# Patient Record
Sex: MALE | Race: WHITE | URBAN - METROPOLITAN AREA
[De-identification: names, ages, dates, MRNs, and addresses within clinical notes are randomized per-mention and may not be internally consistent; named-entity substitution may affect disease eponyms.]

---

## 2017-11-24 ENCOUNTER — EMERGENCY (EMERGENCY)
Facility: HOSPITAL | Age: 72
LOS: 1 days | Discharge: HOME | End: 2017-11-24
Attending: EMERGENCY MEDICINE | Admitting: EMERGENCY MEDICINE
Payer: MEDICARE

## 2017-11-24 VITALS — RESPIRATION RATE: 19 BRPM | OXYGEN SATURATION: 96 % | TEMPERATURE: 98 F | HEART RATE: 63 BPM | HEIGHT: 70 IN

## 2017-11-24 DIAGNOSIS — Z98.890 OTHER SPECIFIED POSTPROCEDURAL STATES: Chronic | ICD-10-CM

## 2017-11-24 LAB
APPEARANCE UR: CLEAR — SIGNIFICANT CHANGE UP
BILIRUB UR-MCNC: NEGATIVE — SIGNIFICANT CHANGE UP
COLOR SPEC: YELLOW — SIGNIFICANT CHANGE UP
DIFF PNL FLD: NEGATIVE — SIGNIFICANT CHANGE UP
GLUCOSE UR QL: NEGATIVE — SIGNIFICANT CHANGE UP
KETONES UR-MCNC: NEGATIVE — SIGNIFICANT CHANGE UP
LEUKOCYTE ESTERASE UR-ACNC: NEGATIVE — SIGNIFICANT CHANGE UP
NITRITE UR-MCNC: NEGATIVE — SIGNIFICANT CHANGE UP
PH UR: 5.5 — SIGNIFICANT CHANGE UP (ref 5–8)
PROT UR-MCNC: SIGNIFICANT CHANGE UP
RBC CASTS # UR COMP ASSIST: SIGNIFICANT CHANGE UP /HPF (ref 0–2)
SP GR SPEC: 1.03 — HIGH (ref 1.01–1.02)
UROBILINOGEN FLD QL: NEGATIVE — SIGNIFICANT CHANGE UP
WBC UR QL: SIGNIFICANT CHANGE UP /HPF (ref 0–5)

## 2017-11-24 PROCEDURE — 74176 CT ABD & PELVIS W/O CONTRAST: CPT | Mod: 26

## 2017-11-24 PROCEDURE — 99284 EMERGENCY DEPT VISIT MOD MDM: CPT

## 2017-11-24 RX ORDER — ACETAMINOPHEN 500 MG
650 TABLET ORAL ONCE
Qty: 0 | Refills: 0 | Status: COMPLETED | OUTPATIENT
Start: 2017-11-24 | End: 2017-11-24

## 2017-11-24 RX ORDER — VALSARTAN 80 MG/1
0 TABLET ORAL
Qty: 0 | Refills: 0 | COMMUNITY

## 2017-11-24 RX ORDER — ESOMEPRAZOLE MAGNESIUM 40 MG/1
0 CAPSULE, DELAYED RELEASE ORAL
Qty: 0 | Refills: 0 | COMMUNITY

## 2017-11-24 RX ORDER — LIDOCAINE 4 G/100G
1 CREAM TOPICAL ONCE
Qty: 0 | Refills: 0 | Status: COMPLETED | OUTPATIENT
Start: 2017-11-24 | End: 2017-11-24

## 2017-11-24 RX ORDER — GABAPENTIN 400 MG/1
1 CAPSULE ORAL
Qty: 0 | Refills: 0 | COMMUNITY

## 2017-11-24 RX ORDER — ROSUVASTATIN CALCIUM 5 MG/1
0 TABLET ORAL
Qty: 0 | Refills: 0 | COMMUNITY

## 2017-11-24 RX ORDER — QUETIAPINE FUMARATE 200 MG/1
0 TABLET, FILM COATED ORAL
Qty: 0 | Refills: 0 | COMMUNITY

## 2017-11-24 RX ORDER — OXYCODONE AND ACETAMINOPHEN 5; 325 MG/1; MG/1
1 TABLET ORAL ONCE
Qty: 0 | Refills: 0 | Status: DISCONTINUED | OUTPATIENT
Start: 2017-11-24 | End: 2017-11-24

## 2017-11-24 RX ADMIN — LIDOCAINE 1 PATCH: 4 CREAM TOPICAL at 23:28

## 2017-11-24 RX ADMIN — Medication 650 MILLIGRAM(S): at 23:23

## 2017-11-24 RX ADMIN — OXYCODONE AND ACETAMINOPHEN 1 TABLET(S): 5; 325 TABLET ORAL at 23:23

## 2017-11-24 NOTE — ED ADULT TRIAGE NOTE - CHIEF COMPLAINT QUOTE
chronic lower back pain x worse today, non traumatic, (denies bowel/bladder incontinence), +ambulatory

## 2017-11-24 NOTE — ED PROVIDER NOTE - CHIEF COMPLAINT
The patient is a 72y Male complaining of The patient is a 72y Male complaining of low back pain x 2 days.

## 2017-11-24 NOTE — ED PROVIDER NOTE - CARE PLAN
Principal Discharge DX:	Acute midline low back pain without sciatica  Secondary Diagnosis:	Disc degeneration, lumbar

## 2017-11-24 NOTE — ED PROVIDER NOTE - NS CPE EDP MUSC LUMBAR LOC
Limited ROM with flexion and extension at the waist. No bruising, discoloration, or obvious signs of trauma./tenderness

## 2017-11-24 NOTE — ED PROVIDER NOTE - ATTENDING CONTRIBUTION TO CARE
71 yo M presents with 2 days of low back pain across the entire lower back. 8/10 worse with movement. there is radiation to bilateral testicles and he reports 5/10 pain in both testicles. no urinary complaints. No radiation down the legs. No associated incontinence, weakness/numbness.   PE with diffuse L spine TTP. normal rectal tone, normal perirectal sensation. normal strength and sensation of lower extremities. no abdominal TTP. bilateral testicular TTP, but no associated swelling or redness. 73 yo M presents with 2 days of low back pain across the entire lower back. 8/10 worse with movement. there is radiation to bilateral testicles and he reports 5/10 pain in both testicles. no urinary complaints. No radiation down the legs. No associated incontinence, weakness/numbness.   PE with diffuse L spine TTP. normal rectal tone, normal perirectal sensation. normal strength and sensation of lower extremities. no abdominal TTP. bilateral testicular TTP, but no associated swelling or redness.  ED workup reveals  multilevel degenerative spine disease with multilevel disc bulge resultsing in mild-moderate spinal canal stenosis. there are also other incidental findings which I discussed with pt. patient was started on prednisone in the ER and treated with pain meds. on repeat eval he reported full resolution of his pain, and he was observed ambulating without any difficulty. he remains entirely neuro intact, and therefore I believe patient is safe to be discharged with spine followup.  I had ordered US testicles to evaluate for testicualr torsion/epidymitis/orchitis/etc, however patient stated that he felt back to normal and declined US testicles. pt is AAOx3 has full decisonal making capicity, and understands that without US we cannot r/o testicular torsion which would result in loss of testicle.   greyson was discharged at his request with instructions to rest, avoid heavy lifting/bending/physical activity, and f/u with PMD and spine Dr in 1-2 days for repeat eval and further management    The patient was discharged from the ED in stable condition. All results of today's workup were discussed with the patient and all questions/concerns were addressed. All discharge instructions were thoroughly discussed with the patient, as well as important warning signs and new/ worsening symptoms which should necessitate patient's immediate return to the ED. The patient is agreeable with discharge and expresses full understanding of all instructions given.

## 2017-11-24 NOTE — ED PROVIDER NOTE - CONSTITUTIONAL, MLM
normal... Well appearing, well nourished, awake, alert, oriented to person, place, time/situation. Appears slightly uncomfortable but in no acute distress.

## 2017-11-24 NOTE — ED ADULT NURSE NOTE - OBJECTIVE STATEMENT
Patient presented to ED ambulatory w/wife c/o lower back pain radiating to both legs since yesterday. Patient stated this pain is chronic but "never" this much pain. No further complaints.

## 2017-11-24 NOTE — ED PROVIDER NOTE - PMH
Back pain    GERD (gastroesophageal reflux disease)    High cholesterol    HTN (hypertension)    Insomnia    Prostate cancer

## 2017-11-24 NOTE — ED PROVIDER NOTE - GASTROINTESTINAL, MLM
No discoloration or pulsatile mass noted. Abdomen is soft, non-tender and non-distended. No guarding or rigidity noted.

## 2017-11-24 NOTE — ED PROVIDER NOTE - CHPI ED SYMPTOMS NEG
no bowel dysfunction/no constipation/no motor function loss/no neck tenderness/no numbness/no fatigue/no bladder dysfunction/no tingling

## 2017-11-24 NOTE — ED PROVIDER NOTE - GENITOURINARY TESTICULAR EXAM, LEFT
No swelling or discoloration noted. Tenderness noted to palpation of left testicle./TENDERNESS/cremasteric reflex present No swelling or discoloration noted. No abnormal lie. Tenderness noted to palpation of left testicle with palpable epididymis. Testicle rises appropriately when eliciting cremasteric reflex/TENDERNESS/cremasteric reflex present

## 2017-11-24 NOTE — ED PROVIDER NOTE - OBJECTIVE STATEMENT
Patient is a 71 yo male with a PMHx of HTN, Prostate Cancer (radiation and chemo treatment 1 year prior), Patient is a 71 yo male with a PMHx of HTN, Prostate Cancer (radiation and chemo treatment 1 year prior), and GERD presenting to the ED complaining of low back pain with radiation to the bilateral testicles since yesterday morning. Patient does not recall what he was doing when the low back pain began yesterday. He had taken Voltaren for the pain yesterday with no relief. This morning he was stepping into the shower when he felt the pain acutely worsen. The pain is constant, located midline in his lower back with some radiation to both testicles. The back pain is worsened with any movement and he has a tough time finding a comfortable position. He can ambulate on his own but states he feels pain in his lower back with each step. He denies radiation or numbness/tingling down either leg. He denies any fever/chills, saddle anesthesia, bowel/bladder incontinence, dysuria, hematuria, frequency, N/V Patient is a 73 yo male with a PMHx of HTN, Prostate Cancer (radiation and chemo treatment 1 year prior), and GERD presenting to the ED complaining of low back pain with radiation to the bilateral testicles since yesterday morning. Patient does not recall what he was doing when the low back pain began yesterday. He had taken Voltaren for the pain yesterday with no relief. This morning he was stepping into the shower when he felt the pain acutely worsen. The pain is constant, located midline in his lower back with some radiation to both testicles. The back pain is worsened with any movement and he has a tough time finding a comfortable position. He can ambulate on his own but states he feels pain in his lower back with each step. He denies radiation or numbness/tingling down either leg. He denies any fever/chills, saddle anesthesia, bowel/bladder incontinence, dysuria, hematuria, frequency, N/V, abdominal pain, chest pain, headache, or shortness of breath. Patient is a 73 yo male with a PMHx of HTN, Prostate Cancer (radiation and chemo treatment 1 year prior), and GERD presenting to the ED complaining of low back pain with radiation to the bilateral testicles since yesterday morning. Patient does not recall what he was doing when the low back pain began yesterday. He had taken Voltaren for the pain yesterday with no relief. This morning he was stepping into the shower when he felt the pain acutely worsen. The pain is constant, located midline in his lower back with some radiation to both testicles. The back pain is worsened with any movement and he has a tough time finding a comfortable position. He can ambulate on his own but states he feels pain in his lower back with each step. He denies radiation to the legs or numbness/tingling down either leg. The testicle pain is dull and felt in both testicles equally. He denies any fever/chills, saddle anesthesia, bowel/bladder incontinence, dysuria, hematuria, frequency, N/V, abdominal pain, chest pain, headache, or shortness of breath.

## 2017-11-25 VITALS
HEART RATE: 60 BPM | DIASTOLIC BLOOD PRESSURE: 80 MMHG | SYSTOLIC BLOOD PRESSURE: 119 MMHG | TEMPERATURE: 98 F | RESPIRATION RATE: 18 BRPM | OXYGEN SATURATION: 97 %

## 2017-11-25 LAB
CULTURE RESULTS: NO GROWTH — SIGNIFICANT CHANGE UP
SPECIMEN SOURCE: SIGNIFICANT CHANGE UP

## 2017-11-25 PROCEDURE — 74176 CT ABD & PELVIS W/O CONTRAST: CPT

## 2017-11-25 PROCEDURE — 87086 URINE CULTURE/COLONY COUNT: CPT

## 2017-11-25 PROCEDURE — 81001 URINALYSIS AUTO W/SCOPE: CPT

## 2017-11-25 PROCEDURE — 72131 CT LUMBAR SPINE W/O DYE: CPT | Mod: 26

## 2017-11-25 PROCEDURE — 99284 EMERGENCY DEPT VISIT MOD MDM: CPT | Mod: 25

## 2017-11-25 RX ADMIN — Medication 40 MILLIGRAM(S): at 01:37

## 2017-11-25 RX ADMIN — OXYCODONE AND ACETAMINOPHEN 1 TABLET(S): 5; 325 TABLET ORAL at 00:38

## 2017-11-27 NOTE — ED POST DISCHARGE NOTE - RESULT SUMMARY
pharmacy called, pharmacist Elisabeth stated oxycodone-acetaminophen 5/300 was sent for patient but this formulation does not exist, cancelled prescription and requesting new rx be sent. Was sent to pharmacy for patient. -Cintia Gonzalez PA-C

## 2021-09-03 ENCOUNTER — TRANSCRIPTION ENCOUNTER (OUTPATIENT)
Age: 76
End: 2021-09-03

## 2022-09-10 NOTE — ED PROVIDER NOTE - CONSTITUTIONAL NEGATIVE STATEMENT, MLM
Epidural catheter Procedure Note    Pre-Procedure   Staff -        Anesthesiologist:  Benjamin Saleh DO       Performed By: anesthesiologist       Location: OB       Pre-Anesthestic Checklist: patient identified, IV checked, risks and benefits discussed, informed consent, monitors and equipment checked, pre-op evaluation and at physician/surgeon's request  Timeout:       Correct Patient: Yes        Correct Procedure: Yes        Correct Site: Yes        Correct Position: Yes   Procedure Documentation  Procedure: epidural catheter       Patient Position: sitting       Patient Prep/Sterile Barriers: sterile gloves, mask, patient draped       Skin prep: Betadine       Local skin infiltrated with 3 mL of 1% lidocaine.        Insertion Site: L4-5. (midline approach).       Technique: LORT saline        AVERY at 3.5 cm.       Needle Type: Hotswapy needle       Needle Gauge: 17.        Needle Length (Inches): 3.5        Catheter: 19 G.          Catheter threaded easily.         7 cm epidural space.         Threaded 3.5 cm at skin.         # of attempts: 1 and  # of redirects:  1    Assessment/Narrative         Paresthesias: No.       Test dose of 5 mL lidocaine 1.5% w/ 1:200,000 epinephrine at.         Test dose negative, 3 minutes after injection, for signs of intravascular, subdural, or intrathecal injection.       Insertion/Infusion Method: LORT saline       Aspiration negative for Heme or CSF via Epidural Catheter.    Medication(s) Administered   0.2% Ropivacaine (Epidural) - EPIDURAL   8 mL - 9/10/2022 4:47:00 AM  Fentanyl PF (Epidural) - EPIDURAL   100 mcg - 9/10/2022 4:47:00 AM     no fever and no chills.

## 2023-09-22 ENCOUNTER — EMERGENCY (EMERGENCY)
Facility: HOSPITAL | Age: 78
LOS: 1 days | Discharge: HOME | End: 2023-09-22
Attending: EMERGENCY MEDICINE
Payer: MEDICARE

## 2023-09-22 VITALS
OXYGEN SATURATION: 98 % | RESPIRATION RATE: 20 BRPM | TEMPERATURE: 98 F | DIASTOLIC BLOOD PRESSURE: 82 MMHG | SYSTOLIC BLOOD PRESSURE: 118 MMHG | HEART RATE: 76 BPM

## 2023-09-22 VITALS
WEIGHT: 214.95 LBS | HEIGHT: 71 IN | DIASTOLIC BLOOD PRESSURE: 83 MMHG | OXYGEN SATURATION: 96 % | SYSTOLIC BLOOD PRESSURE: 126 MMHG | TEMPERATURE: 98 F | HEART RATE: 69 BPM | RESPIRATION RATE: 20 BRPM

## 2023-09-22 PROCEDURE — 99284 EMERGENCY DEPT VISIT MOD MDM: CPT | Mod: 25

## 2023-09-22 PROCEDURE — 90715 TDAP VACCINE 7 YRS/> IM: CPT

## 2023-09-22 PROCEDURE — 90471 IMMUNIZATION ADMIN: CPT

## 2023-09-22 PROCEDURE — 70450 CT HEAD/BRAIN W/O DYE: CPT | Mod: MA

## 2023-09-22 PROCEDURE — 70450 CT HEAD/BRAIN W/O DYE: CPT | Mod: 26,MA

## 2023-09-22 PROCEDURE — 99284 EMERGENCY DEPT VISIT MOD MDM: CPT

## 2023-09-22 RX ORDER — BACITRACIN ZINC 500 UNIT/G
1 OINTMENT IN PACKET (EA) TOPICAL ONCE
Refills: 0 | Status: COMPLETED | OUTPATIENT
Start: 2023-09-22 | End: 2023-09-22

## 2023-09-22 RX ORDER — ACETAMINOPHEN 500 MG
650 TABLET ORAL ONCE
Refills: 0 | Status: COMPLETED | OUTPATIENT
Start: 2023-09-22 | End: 2023-09-22

## 2023-09-22 RX ORDER — TETANUS TOXOID, REDUCED DIPHTHERIA TOXOID AND ACELLULAR PERTUSSIS VACCINE, ADSORBED 5; 2.5; 8; 8; 2.5 [IU]/.5ML; [IU]/.5ML; UG/.5ML; UG/.5ML; UG/.5ML
0.5 SUSPENSION INTRAMUSCULAR ONCE
Refills: 0 | Status: COMPLETED | OUTPATIENT
Start: 2023-09-22 | End: 2023-09-22

## 2023-09-22 RX ADMIN — Medication 650 MILLIGRAM(S): at 19:47

## 2023-09-22 RX ADMIN — Medication 1 APPLICATION(S): at 19:47

## 2023-09-22 RX ADMIN — Medication 650 MILLIGRAM(S): at 20:04

## 2023-09-22 RX ADMIN — TETANUS TOXOID, REDUCED DIPHTHERIA TOXOID AND ACELLULAR PERTUSSIS VACCINE, ADSORBED 0.5 MILLILITER(S): 5; 2.5; 8; 8; 2.5 SUSPENSION INTRAMUSCULAR at 19:48

## 2023-09-22 NOTE — ED PROVIDER NOTE - PATIENT PORTAL LINK FT
You can access the FollowMyHealth Patient Portal offered by [facility] by registering at the following website: [email or web address]. By joining [facility]’s FollowMyHealth portal, you will also be able to view your health information using other applications (apps) compatible with our system.

## 2023-09-22 NOTE — ED PROVIDER NOTE - OBJECTIVE STATEMENT
78 yr old male with hx of HTN, HLD, BPH presents to ed c/o blunt head injury pta. pt at his apt, walking upstairs when he misstep on the last step and lost balance causing pt to hit his left side of scalp against the wall. pt scraped scalp. no loc. no ac use, no pain except at scalp. no preceding sx causing fall.

## 2023-09-22 NOTE — ED PROVIDER NOTE - NSFOLLOWUPINSTRUCTIONS_ED_ALL_ED_FT
bacitracin to area 2x/day, keep area dry/clean.  monitor for infection (redness/pus,worsening pain, increase swelling).  ok to wash after 1 day running soap and water.  wound check

## 2023-09-22 NOTE — ED ADULT TRIAGE NOTE - CHIEF COMPLAINT QUOTE
headache s/p unwitnessed fall today, large abrasion to top of head, no active bleeding, denies taking any blood thinners

## 2023-09-22 NOTE — ED PROVIDER NOTE - CLINICAL SUMMARY MEDICAL DECISION MAKING FREE TEXT BOX
78 yr old male with hx of HTN, HLD, BPH presents to ed c/o blunt head injury pta. pt at his apt, walking upstairs when he misstep on the last step and lost balance causing pt to hit his left side of scalp against the wall. pt scraped scalp. no loc. no ac use, no pain except at scalp. no preceding sx causing fall.    scalp abrasion- cth, clean wound with saline. apply bacitracin. wound care. tetanus since unsure last dose. mechanical fall.

## 2023-09-22 NOTE — ED PROVIDER NOTE - PROGRESS NOTE DETAILS
Mckenna: pt c/o right radial wrist mild pain. no scaphoid ttp. no deformity. pt moving it but some pain. advise top stay for xr. pt refused. possibly strain vs fx. pt will see pcp. no heavy lifting. monitor area

## 2024-03-11 NOTE — ED PROVIDER NOTE - LATERALITY
[facility]  [address], [place] [address] [phone]                    March 13, 2024    Nataliya Aldrich  [address]   [place] [id number]      Dear Nataliya,    Here is a summary of your recent test results:    Covid is negative, as well as strep and influenza     We advise:     Continue cares as discussed   Follow up if any issues persist     For additional lab test information, labtestsonline.org is an excellent reference.     Your test results are enclosed.      Please contact me if you have any questions.    In addition, here is a list of due or overdue Health Maintenance reminders.    Health Maintenance Due   Topic Date Due    Colorectal Cancer Screening  08/01/2023    Asthma Action Plan - yearly  04/11/2024       Please call us at [phone] (or use [phone]) to address the above recommendations.            Thank you very much for trusting [facility].     Healthy regards,          Carlyle Basilio M.D.        Results for orders placed or performed in visit on 03/11/24   Symptomatic COVID-19 Virus (Coronavirus) by PCR Nose     Status: Normal    Specimen: Nose; Swab   Result Value Ref Range    SARS CoV2 PCR Negative Negative    Narrative    Testing was performed using the [employer]ita SARS-CoV-2 assay on the [employer]  [employer] System. This test should be ordered for the detection of  SARS-CoV-2 in individuals who meet SARS-CoV-2 clinical and/or  epidemiological criteria. Test performance is unknown in asymptomatic  patients. This test is for in vitro diagnostic use under the FDA EUA  for laboratories certified under CLIA to perform high and/or moderate  complexity testing. This test has not been FDA cleared or approved. A  negative result does not rule out the presence of PCR inhibitors in  the specimen or target RNA in concentration below the limit of  detection for the assay. The possibility of a false negative should  be  considered if the patient's recent exposure or clinical  presentation suggests COVID-19. This test was validated by the [facility]. This  laboratory is certified under the Clinical Laboratory Improvement  Amendments of 1988 (CLIA-88) as qualified to perform high and/or  moderate complexity laboratory testing.   Streptococcus A Rapid Screen w/Reflex to PCR - Clinic Collect     Status: Normal    Specimen: Throat; Swab   Result Value Ref Range    Group A Strep antigen Negative Negative   Influenza A & B Antigen - Clinic Collect     Status: Normal    Specimen: Nose; Swab   Result Value Ref Range    Influenza A antigen Negative Negative    Influenza B antigen Negative Negative    Narrative    Test results must be correlated with clinical data. If necessary, results should be confirmed by a molecular assay or viral culture.   Group A Streptococcus PCR Throat Swab     Status: Normal    Specimen: Throat; Swab   Result Value Ref Range    Group A strep by PCR Not Detected Not Detected    Narrative    The Xpert Xpress Strep A test, performed on the [employer], is a rapid, qualitative in vitro diagnostic test for the detection of Streptococcus pyogenes (Group A ß-hemolytic Streptococcus, Strep A) in throat swab specimens from patients with signs and symptoms of pharyngitis. The Xpert Xpress Strep A test can be used as an aid in the diagnosis of Group A Streptococcal pharyngitis. The assay is not intended to monitor treatment for Group A Streptococcus infections. The Xpert Xpress Strep A test utilizes an automated real-time polymerase chain reaction (PCR) to detect Streptococcus pyogenes DNA.        Midline